# Patient Record
Sex: MALE | Race: BLACK OR AFRICAN AMERICAN | NOT HISPANIC OR LATINO | URBAN - METROPOLITAN AREA
[De-identification: names, ages, dates, MRNs, and addresses within clinical notes are randomized per-mention and may not be internally consistent; named-entity substitution may affect disease eponyms.]

---

## 2020-10-30 ENCOUNTER — EMERGENCY (EMERGENCY)
Facility: HOSPITAL | Age: 65
LOS: 1 days | Discharge: HOME | End: 2020-10-30
Attending: EMERGENCY MEDICINE
Payer: MEDICAID

## 2020-10-30 VITALS
WEIGHT: 248.02 LBS | OXYGEN SATURATION: 98 % | RESPIRATION RATE: 24 BRPM | HEIGHT: 70 IN | HEART RATE: 99 BPM | SYSTOLIC BLOOD PRESSURE: 207 MMHG | TEMPERATURE: 99 F | DIASTOLIC BLOOD PRESSURE: 104 MMHG

## 2020-10-30 VITALS — DIASTOLIC BLOOD PRESSURE: 114 MMHG | SYSTOLIC BLOOD PRESSURE: 216 MMHG

## 2020-10-30 LAB
ALBUMIN SERPL ELPH-MCNC: 3.8 G/DL — SIGNIFICANT CHANGE UP (ref 3.5–5)
ALP SERPL-CCNC: 90 U/L — SIGNIFICANT CHANGE UP (ref 40–120)
ALT FLD-CCNC: 40 U/L DA — SIGNIFICANT CHANGE UP (ref 10–60)
ANION GAP SERPL CALC-SCNC: 7 MMOL/L — SIGNIFICANT CHANGE UP (ref 5–17)
AST SERPL-CCNC: 27 U/L — SIGNIFICANT CHANGE UP (ref 10–40)
BASOPHILS # BLD AUTO: 0.03 K/UL — SIGNIFICANT CHANGE UP (ref 0–0.2)
BASOPHILS NFR BLD AUTO: 0.4 % — SIGNIFICANT CHANGE UP (ref 0–2)
BILIRUB SERPL-MCNC: 0.3 MG/DL — SIGNIFICANT CHANGE UP (ref 0.2–1.2)
BUN SERPL-MCNC: 16 MG/DL — SIGNIFICANT CHANGE UP (ref 7–18)
CALCIUM SERPL-MCNC: 8.7 MG/DL — SIGNIFICANT CHANGE UP (ref 8.4–10.5)
CHLORIDE SERPL-SCNC: 110 MMOL/L — HIGH (ref 96–108)
CO2 SERPL-SCNC: 26 MMOL/L — SIGNIFICANT CHANGE UP (ref 22–31)
CREAT SERPL-MCNC: 1.42 MG/DL — HIGH (ref 0.5–1.3)
D DIMER BLD IA.RAPID-MCNC: 167 NG/ML DDU — SIGNIFICANT CHANGE UP
EOSINOPHIL # BLD AUTO: 0.17 K/UL — SIGNIFICANT CHANGE UP (ref 0–0.5)
EOSINOPHIL NFR BLD AUTO: 2.3 % — SIGNIFICANT CHANGE UP (ref 0–6)
GLUCOSE SERPL-MCNC: 112 MG/DL — HIGH (ref 70–99)
HCT VFR BLD CALC: 44.2 % — SIGNIFICANT CHANGE UP (ref 39–50)
HGB BLD-MCNC: 14.8 G/DL — SIGNIFICANT CHANGE UP (ref 13–17)
IMM GRANULOCYTES NFR BLD AUTO: 0.4 % — SIGNIFICANT CHANGE UP (ref 0–1.5)
LYMPHOCYTES # BLD AUTO: 1.91 K/UL — SIGNIFICANT CHANGE UP (ref 1–3.3)
LYMPHOCYTES # BLD AUTO: 25.8 % — SIGNIFICANT CHANGE UP (ref 13–44)
MAGNESIUM SERPL-MCNC: 2.3 MG/DL — SIGNIFICANT CHANGE UP (ref 1.6–2.6)
MCHC RBC-ENTMCNC: 30.5 PG — SIGNIFICANT CHANGE UP (ref 27–34)
MCHC RBC-ENTMCNC: 33.5 GM/DL — SIGNIFICANT CHANGE UP (ref 32–36)
MCV RBC AUTO: 90.9 FL — SIGNIFICANT CHANGE UP (ref 80–100)
MONOCYTES # BLD AUTO: 0.38 K/UL — SIGNIFICANT CHANGE UP (ref 0–0.9)
MONOCYTES NFR BLD AUTO: 5.1 % — SIGNIFICANT CHANGE UP (ref 2–14)
NEUTROPHILS # BLD AUTO: 4.89 K/UL — SIGNIFICANT CHANGE UP (ref 1.8–7.4)
NEUTROPHILS NFR BLD AUTO: 66 % — SIGNIFICANT CHANGE UP (ref 43–77)
NRBC # BLD: 0 /100 WBCS — SIGNIFICANT CHANGE UP (ref 0–0)
NT-PROBNP SERPL-SCNC: 352 PG/ML — HIGH (ref 0–125)
NT-PROBNP SERPL-SCNC: 401 PG/ML — HIGH (ref 0–125)
PLATELET # BLD AUTO: 343 K/UL — SIGNIFICANT CHANGE UP (ref 150–400)
POTASSIUM SERPL-MCNC: 4.1 MMOL/L — SIGNIFICANT CHANGE UP (ref 3.5–5.3)
POTASSIUM SERPL-SCNC: 4.1 MMOL/L — SIGNIFICANT CHANGE UP (ref 3.5–5.3)
PROT SERPL-MCNC: 8.5 G/DL — HIGH (ref 6–8.3)
RBC # BLD: 4.86 M/UL — SIGNIFICANT CHANGE UP (ref 4.2–5.8)
RBC # FLD: 13.2 % — SIGNIFICANT CHANGE UP (ref 10.3–14.5)
SARS-COV-2 RNA SPEC QL NAA+PROBE: SIGNIFICANT CHANGE UP
SODIUM SERPL-SCNC: 143 MMOL/L — SIGNIFICANT CHANGE UP (ref 135–145)
TROPONIN I SERPL-MCNC: 0.07 NG/ML — HIGH (ref 0–0.04)
TROPONIN I SERPL-MCNC: 0.08 NG/ML — HIGH (ref 0–0.04)
WBC # BLD: 7.41 K/UL — SIGNIFICANT CHANGE UP (ref 3.8–10.5)
WBC # FLD AUTO: 7.41 K/UL — SIGNIFICANT CHANGE UP (ref 3.8–10.5)

## 2020-10-30 PROCEDURE — 71046 X-RAY EXAM CHEST 2 VIEWS: CPT

## 2020-10-30 PROCEDURE — 85379 FIBRIN DEGRADATION QUANT: CPT

## 2020-10-30 PROCEDURE — 87635 SARS-COV-2 COVID-19 AMP PRB: CPT

## 2020-10-30 PROCEDURE — 84484 ASSAY OF TROPONIN QUANT: CPT

## 2020-10-30 PROCEDURE — 83735 ASSAY OF MAGNESIUM: CPT

## 2020-10-30 PROCEDURE — 99292 CRITICAL CARE ADDL 30 MIN: CPT

## 2020-10-30 PROCEDURE — 85025 COMPLETE CBC W/AUTO DIFF WBC: CPT

## 2020-10-30 PROCEDURE — 80053 COMPREHEN METABOLIC PANEL: CPT

## 2020-10-30 PROCEDURE — 96375 TX/PRO/DX INJ NEW DRUG ADDON: CPT

## 2020-10-30 PROCEDURE — 36415 COLL VENOUS BLD VENIPUNCTURE: CPT

## 2020-10-30 PROCEDURE — 71046 X-RAY EXAM CHEST 2 VIEWS: CPT | Mod: 26

## 2020-10-30 PROCEDURE — 99291 CRITICAL CARE FIRST HOUR: CPT

## 2020-10-30 PROCEDURE — 93005 ELECTROCARDIOGRAM TRACING: CPT

## 2020-10-30 PROCEDURE — 99291 CRITICAL CARE FIRST HOUR: CPT | Mod: 25

## 2020-10-30 PROCEDURE — 96374 THER/PROPH/DIAG INJ IV PUSH: CPT

## 2020-10-30 PROCEDURE — 99292 CRITICAL CARE ADDL 30 MIN: CPT | Mod: 25

## 2020-10-30 PROCEDURE — 83880 ASSAY OF NATRIURETIC PEPTIDE: CPT

## 2020-10-30 RX ORDER — METOPROLOL TARTRATE 50 MG
1 TABLET ORAL
Qty: 30 | Refills: 0
Start: 2020-10-30 | End: 2020-11-28

## 2020-10-30 RX ORDER — ASPIRIN/CALCIUM CARB/MAGNESIUM 324 MG
162 TABLET ORAL ONCE
Refills: 0 | Status: COMPLETED | OUTPATIENT
Start: 2020-10-30 | End: 2020-10-30

## 2020-10-30 RX ORDER — LABETALOL HCL 100 MG
20 TABLET ORAL ONCE
Refills: 0 | Status: COMPLETED | OUTPATIENT
Start: 2020-10-30 | End: 2020-10-30

## 2020-10-30 RX ORDER — AMLODIPINE BESYLATE 2.5 MG/1
10 TABLET ORAL ONCE
Refills: 0 | Status: COMPLETED | OUTPATIENT
Start: 2020-10-30 | End: 2020-10-30

## 2020-10-30 RX ORDER — METOPROLOL TARTRATE 50 MG
100 TABLET ORAL ONCE
Refills: 0 | Status: COMPLETED | OUTPATIENT
Start: 2020-10-30 | End: 2020-10-30

## 2020-10-30 RX ORDER — AMLODIPINE BESYLATE 2.5 MG/1
1 TABLET ORAL
Qty: 30 | Refills: 0
Start: 2020-10-30 | End: 2020-11-28

## 2020-10-30 RX ORDER — HYDRALAZINE HCL 50 MG
10 TABLET ORAL ONCE
Refills: 0 | Status: COMPLETED | OUTPATIENT
Start: 2020-10-30 | End: 2020-10-30

## 2020-10-30 RX ADMIN — Medication 20 MILLIGRAM(S): at 11:28

## 2020-10-30 RX ADMIN — Medication 10 MILLIGRAM(S): at 13:52

## 2020-10-30 RX ADMIN — Medication 162 MILLIGRAM(S): at 16:19

## 2020-10-30 RX ADMIN — Medication 100 MILLIGRAM(S): at 16:19

## 2020-10-30 RX ADMIN — AMLODIPINE BESYLATE 10 MILLIGRAM(S): 2.5 TABLET ORAL at 16:19

## 2020-10-30 NOTE — ED PROVIDER NOTE - CARDIAC, MLM
Normal rate, regular rhythm.  Heart sounds S1, S2.  No murmurs, rubs or gallops. no jvd, no leg swelling

## 2020-10-30 NOTE — ED PROVIDER NOTE - OBJECTIVE STATEMENT
65 yr old male with hx of HTn only on metoprolol 100mg daily but ran out of it 2 days ago presents to ed c/o VÁZQUEZ while at wor at 730a.  pt states been tired since being home during pandemic.  today doing usual walking and c/o sob and weak. no fever, no chills, no visual changes, no headache, no numbness or tingling, no cough, no cp, no palpitations, no leg swelling, no syncope, no abd pain, no n/v/d, no dysuria, no rashes, no drug use.  increase weigh since pandemic, using normally 1-2 pillow

## 2020-10-30 NOTE — ED PROVIDER NOTE - CARE PLAN
Principal Discharge DX:	NSTEMI (non-ST elevated myocardial infarction)  Secondary Diagnosis:	Hypertensive emergency

## 2020-10-30 NOTE — ED PROVIDER NOTE - PROGRESS NOTE DETAILS
yan: work up mild georgie with trop 0.06 cxr official read cardiomegaly otherwise no acute findings. pt states he feels well no sx. no cp. rpt trop/ekg/bnp at 210p. yan: received 20 mg labetalol and hydralzine 10mg. still HTN but better. pt rpt trop .08. rpt ekg unchanged no dynamic changes or pseudonormalization.  pt BP remains elevated. refusing admission due to needing to care for mom at home.  understand all the risk, benefit and alternatives.  pt knowsn that he has an NSTEMI and Bp is out of control and can be deathly including, organ failure, permanent dysfunction, heart failure or stroke/renal failure.  ama- NSTEMi and HTN emergency

## 2020-10-30 NOTE — ED PROVIDER NOTE - PATIENT PORTAL LINK FT
You can access the FollowMyHealth Patient Portal offered by [facility] by registering at the following website: [email or web address]. By joining [facility] [employer]’s FollowMyHealth portal, you will also be able to view your health information using other applications (apps) compatible with our system.

## 2020-10-30 NOTE — ED PROVIDER NOTE - REFUSAL OF SERVICE, MDM
I had a detailed discussion with the patient and/or guardian regarding the historical points, exam findings, and any diagnostic results supporting the need to be admitted. Pt refuses admission and is aware of risks  such as worsening condition and possible death. Patient is fully alert and oriented and has self determination and mental capacity to make decisions.

## 2020-11-02 ENCOUNTER — APPOINTMENT (OUTPATIENT)
Dept: CARDIOLOGY | Facility: CLINIC | Age: 65
End: 2020-11-02
Payer: MEDICAID

## 2020-11-02 VITALS — SYSTOLIC BLOOD PRESSURE: 195 MMHG | DIASTOLIC BLOOD PRESSURE: 118 MMHG

## 2020-11-02 VITALS
BODY MASS INDEX: 38.94 KG/M2 | TEMPERATURE: 97.3 F | HEART RATE: 83 BPM | DIASTOLIC BLOOD PRESSURE: 142 MMHG | OXYGEN SATURATION: 98 % | HEIGHT: 70 IN | RESPIRATION RATE: 16 BRPM | SYSTOLIC BLOOD PRESSURE: 233 MMHG | WEIGHT: 272 LBS

## 2020-11-02 DIAGNOSIS — Z82.49 FAMILY HISTORY OF ISCHEMIC HEART DISEASE AND OTHER DISEASES OF THE CIRCULATORY SYSTEM: ICD-10-CM

## 2020-11-02 DIAGNOSIS — R77.8 OTHER SPECIFIED ABNORMALITIES OF PLASMA PROTEINS: ICD-10-CM

## 2020-11-02 DIAGNOSIS — Z78.9 OTHER SPECIFIED HEALTH STATUS: ICD-10-CM

## 2020-11-02 PROBLEM — Z00.00 ENCOUNTER FOR PREVENTIVE HEALTH EXAMINATION: Status: ACTIVE | Noted: 2020-11-02

## 2020-11-02 PROCEDURE — 99072 ADDL SUPL MATRL&STAF TM PHE: CPT

## 2020-11-02 PROCEDURE — 99204 OFFICE O/P NEW MOD 45 MIN: CPT

## 2020-11-02 RX ORDER — METOPROLOL SUCCINATE 100 MG/1
100 TABLET, EXTENDED RELEASE ORAL
Refills: 0 | Status: ACTIVE | COMMUNITY

## 2020-11-02 NOTE — HISTORY OF PRESENT ILLNESS
[FreeTextEntry1] : 65-year-old male with hypertension on metoprolol since 2018 who presents for initial evaluation. Patient was seen in the emergency room at [facility] on October 30. 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 He states that the symptoms lasted for about 10 minutes, and when EMS arrived and took him outside, he felt the "cold air" and immediately felt back to normal. In the emergency room, patient was noted to be hypertensive, with mild SALBADOR as well as elevated troponin levels. However, patient left AMA before further workup or treatment could be performed.\par \par Patient reports that he has been feeling completely normal since his hospital visit, with no further recurrence of dyspnea. Patient also denies any chest pain, palpitations, headaches, vision changes, neurologic deficits, LE edema, orthopnea, PND, or any other complaints at all.

## 2020-11-02 NOTE — ASSESSMENT
[FreeTextEntry1] : 65-year-old male with hypertension who presents for initial evaluation; recently to the ER with ? hypertensive urgency. \par \par 1. HTN: Since patient has no symptoms at all in the setting of such a high blood pressure, it suggests that BP has been chronically elevated in his case. I discussed with patient that he will likely need to be on multiple medications to get it under control. Will start with addition of amlodipine today and keep adding medications over the next few weeks until the blood pressure is at target.\par -Will also send patient for echocardiogram to assess EF as well as LVH\par -Will repeat all blood work including renal function. If his creatinine is stable, will plan on adding ACE inhibitor as the next antihypertensive agent\par -I discussed with patient that normally I would recommend him going to the emergency room with such a high blood pressure, however since he left AMA for the same reason, I agreed to treat him on an outpatient basis as long as he monitors himself for any symptoms, and immediately contacts EMS should he develop any chest pain, dyspnea, palpitations, headaches, vision changes, or any other concerning findings.\par -He will buy sphygmomanometer to track BP at home\par -Patient was instructed to followup with his PMD in 2 weeks to further titrate the medications, I will also have his blood pressure checked at the time of his echocardiogram in the hospital \par -Patient will see me in one month, he will call me if there are any issues\par \par 2.HLD: Will send patient for full set of routine blood work including lipid panel, LFTs, TFTs, A1c, et cetera.\par \par 3. Elevated troponin level: This likely represents type II MI (demand ischemia) in setting of hypertension as well as SALBADOR/CKD.\par -Will send patient for nuclear stress test once his blood pressure is better controlled. Ideally, would like him to run on the treadmill, however this can only be done when he is close to normotensive.\par \par FUV 1 month

## 2020-11-13 ENCOUNTER — OUTPATIENT (OUTPATIENT)
Dept: OUTPATIENT SERVICES | Facility: HOSPITAL | Age: 65
LOS: 1 days | End: 2020-11-13
Payer: MEDICAID

## 2020-11-13 DIAGNOSIS — I10 ESSENTIAL (PRIMARY) HYPERTENSION: ICD-10-CM

## 2020-11-13 PROCEDURE — 93306 TTE W/DOPPLER COMPLETE: CPT | Mod: 26

## 2020-11-13 PROCEDURE — 93306 TTE W/DOPPLER COMPLETE: CPT

## 2020-11-19 ENCOUNTER — NON-APPOINTMENT (OUTPATIENT)
Age: 65
End: 2020-11-19

## 2020-12-07 ENCOUNTER — APPOINTMENT (OUTPATIENT)
Dept: CARDIOLOGY | Facility: CLINIC | Age: 65
End: 2020-12-07
Payer: MEDICAID

## 2020-12-07 VITALS
DIASTOLIC BLOOD PRESSURE: 102 MMHG | WEIGHT: 272 LBS | SYSTOLIC BLOOD PRESSURE: 162 MMHG | HEIGHT: 70 IN | TEMPERATURE: 98 F | BODY MASS INDEX: 38.94 KG/M2 | OXYGEN SATURATION: 98 % | RESPIRATION RATE: 17 BRPM | HEART RATE: 95 BPM

## 2020-12-07 PROCEDURE — 99072 ADDL SUPL MATRL&STAF TM PHE: CPT

## 2020-12-07 PROCEDURE — 99214 OFFICE O/P EST MOD 30 MIN: CPT

## 2020-12-08 LAB
ALBUMIN SERPL ELPH-MCNC: 4.7 G/DL
ALP BLD-CCNC: 83 U/L
ALT SERPL-CCNC: 18 U/L
AST SERPL-CCNC: 17 U/L
BILIRUB DIRECT SERPL-MCNC: 0.1 MG/DL
BILIRUB INDIRECT SERPL-MCNC: 0.4 MG/DL
BILIRUB SERPL-MCNC: 0.5 MG/DL
CHOLEST SERPL-MCNC: 226 MG/DL
ESTIMATED AVERAGE GLUCOSE: 137 MG/DL
HBA1C MFR BLD HPLC: 6.4 %
HDLC SERPL-MCNC: 45 MG/DL
LDLC SERPL CALC-MCNC: 149 MG/DL
NONHDLC SERPL-MCNC: 181 MG/DL
PROT SERPL-MCNC: 8.3 G/DL
TRIGL SERPL-MCNC: 162 MG/DL
TSH SERPL-ACNC: 2.03 UIU/ML

## 2020-12-08 RX ORDER — ATORVASTATIN CALCIUM 20 MG/1
20 TABLET, FILM COATED ORAL
Qty: 30 | Refills: 5 | Status: ACTIVE | COMMUNITY
Start: 2020-12-08 | End: 1900-01-01

## 2020-12-08 RX ORDER — ENALAPRIL MALEATE 20 MG/1
20 TABLET ORAL
Qty: 30 | Refills: 0 | Status: ACTIVE | COMMUNITY
Start: 2020-11-20

## 2020-12-08 RX ORDER — FUROSEMIDE 40 MG/1
40 TABLET ORAL
Qty: 90 | Refills: 0 | Status: DISCONTINUED | COMMUNITY
Start: 2020-11-20 | End: 2020-12-08

## 2020-12-08 RX ORDER — METOPROLOL TARTRATE 100 MG/1
100 TABLET, FILM COATED ORAL
Qty: 30 | Refills: 0 | Status: DISCONTINUED | COMMUNITY
Start: 2020-10-30 | End: 2020-12-08

## 2020-12-08 NOTE — HISTORY OF PRESENT ILLNESS
[FreeTextEntry1] : 65-year-old male with hypertension, echocardiogram from 11/2020 showing preserved EF, LVH, and grade III diastolic dysfunction, who presents for follow-up visit. Patient reports that he was started on enalapril 20mg by his PMD several weeks ago. Overall he reports he feels better since last visit; reports he no longer has diaphoresis. Denies any chest pain, dyspnea, palpitations, lightheadedness, or syncope. Denies LE edema, orthopnea, or PND. Patient reports compliance with all medications, denies adverse effects.\par \par \par

## 2020-12-08 NOTE — ASSESSMENT
[FreeTextEntry1] : 65-year-old male with hypertension, echocardiogram from 11/2020 showing preserved EF, LVH, and grade III diastolic dysfunction, who presents for follow-up visit.  \par \par 1. HTN: Improved BP control on metoprolol, amlodipine, and enalapril, however still not at target. \par -Uncertain indication for furosemide, will switch to HCTZ instead for BP control\par -Chem 8 ordered. Creatinine at 1.35 may represent CKD in setting of long-standing HTN, will repeat chem 8 next month and if still elevated will send for renal US and nephrology eval. Uncertain why potassium is not included in the chem-8, perhaps it is pending. \par -Patient should also discontinue indomethacin, as it can contribute to HTN as well as to renal insufficiency. Tylenol may be an alternative.  \par \par 2.HLD: Patient's 10-year risk of atherosclerotic cardiovascular disease (ASCVD) as estimated by the pooled cohort equations is 23.9%.\par -Will start patient on atorvastatin 20mg PO QHS\par \par 3. Elevated troponin level: This likely represents type II MI (demand ischemia) in setting of hypertension as well as SALBADOR/CKD.\par -Will send patient for nuclear stress test once his blood pressure is better controlled. Ideally, would like him to run on the treadmill, however this can only be done when he is close to normotensive.\par \par FUV 1 month

## 2020-12-09 LAB
ANION GAP SERPL CALC-SCNC: 13 MMOL/L
BUN SERPL-MCNC: 14 MG/DL
CALCIUM SERPL-MCNC: 9.6 MG/DL
CHLORIDE SERPL-SCNC: 106 MMOL/L
CO2 SERPL-SCNC: 22 MMOL/L
CREAT SERPL-MCNC: 1.35 MG/DL
GLUCOSE SERPL-MCNC: 99 MG/DL
POTASSIUM SERPL-SCNC: 4.4 MMOL/L
SODIUM SERPL-SCNC: 141 MMOL/L

## 2021-01-11 ENCOUNTER — APPOINTMENT (OUTPATIENT)
Dept: CARDIOLOGY | Facility: CLINIC | Age: 66
End: 2021-01-11
Payer: MEDICAID

## 2021-01-11 VITALS
DIASTOLIC BLOOD PRESSURE: 87 MMHG | HEIGHT: 70 IN | OXYGEN SATURATION: 99 % | RESPIRATION RATE: 17 BRPM | TEMPERATURE: 98.4 F | BODY MASS INDEX: 38.94 KG/M2 | WEIGHT: 272 LBS | HEART RATE: 98 BPM | SYSTOLIC BLOOD PRESSURE: 149 MMHG

## 2021-01-11 DIAGNOSIS — I10 ESSENTIAL (PRIMARY) HYPERTENSION: ICD-10-CM

## 2021-01-11 DIAGNOSIS — N17.9 ACUTE KIDNEY FAILURE, UNSPECIFIED: ICD-10-CM

## 2021-01-11 PROCEDURE — 99214 OFFICE O/P EST MOD 30 MIN: CPT

## 2021-01-11 PROCEDURE — 99072 ADDL SUPL MATRL&STAF TM PHE: CPT

## 2021-01-11 RX ORDER — CLINDAMYCIN HYDROCHLORIDE 300 MG/1
300 CAPSULE ORAL
Qty: 24 | Refills: 0 | Status: DISCONTINUED | COMMUNITY
Start: 2020-11-28 | End: 2021-01-11

## 2021-01-11 RX ORDER — INDOMETHACIN 50 MG/1
50 CAPSULE ORAL
Qty: 15 | Refills: 0 | Status: DISCONTINUED | COMMUNITY
Start: 2020-09-20 | End: 2021-01-11

## 2021-01-11 RX ORDER — CHLORHEXIDINE GLUCONATE, 0.12% ORAL RINSE 1.2 MG/ML
0.12 SOLUTION DENTAL
Qty: 473 | Refills: 0 | Status: DISCONTINUED | COMMUNITY
Start: 2020-11-28 | End: 2021-01-11

## 2021-01-11 NOTE — ASSESSMENT
[FreeTextEntry1] : 65-year-old male with hypertension, echocardiogram from 11/2020 showing preserved EF, LVH, and grade III diastolic dysfunction, who presents for follow-up visit.  \par \par 1. HTN: Improved BP control on metoprolol, amlodipine, and enalapril. He has also implemented lifestyle modifications.\par -Will see if patient's BP continues to improve over next 2 months. If not at target by then, will increase enalapril to 40mg daily \par -Patient will go for repeat chem 8 prior to his next visit with me, Rx placed\par \par 2.HLD: Patient's 10-year risk of atherosclerotic cardiovascular disease (ASCVD) as estimated by the pooled cohort equations is 23.9%.\par -Continue atorvastatin 20mg PO QHS; repeat lipid panel in 4 months\par \par 3. Elevated troponin level: This likely represents type II MI (demand ischemia) in setting of hypertension as well as SALBADOR/CKD.\par -Patient with no anginal complaints whatsoever; he is already on beta blocker and statin. Will hold off further cardiac testing unless patient develops any symptoms. \par \par 4. Erectile dysfunction: May be in setting of HTN, as well as beta blocker effect (though cannot decrease dose of medication at this time)\par -Will send patient for evaluation by urology, consideration of PDE inhibitor (no contraindications from cardiac perspective)\par \par FUV 2 months

## 2021-01-11 NOTE — HISTORY OF PRESENT ILLNESS
[FreeTextEntry1] : 65-year-old male with hypertension, echocardiogram from 11/2020 showing preserved EF, LVH, and grade III diastolic dysfunction, who presents for follow-up visit. He works as a . Has no exertional symptoms. Denies any chest pain, dyspnea, palpitations, lightheadedness, or syncope. Denies LE edema, orthopnea, or PND. Patient reports compliance with all medications, denies adverse effects.\par \par He is asking regarding resuming sexual relations, and is concerned about some degree of ED. \par \par \par

## 2021-01-15 ENCOUNTER — APPOINTMENT (OUTPATIENT)
Dept: UROLOGY | Facility: CLINIC | Age: 66
End: 2021-01-15
Payer: MEDICAID

## 2021-01-15 DIAGNOSIS — Z12.5 ENCOUNTER FOR SCREENING FOR MALIGNANT NEOPLASM OF PROSTATE: ICD-10-CM

## 2021-01-15 DIAGNOSIS — I10 ESSENTIAL (PRIMARY) HYPERTENSION: ICD-10-CM

## 2021-01-15 DIAGNOSIS — E78.5 HYPERLIPIDEMIA, UNSPECIFIED: ICD-10-CM

## 2021-01-15 DIAGNOSIS — F52.21 MALE ERECTILE DISORDER: ICD-10-CM

## 2021-01-15 DIAGNOSIS — N52.9 MALE ERECTILE DYSFUNCTION, UNSPECIFIED: ICD-10-CM

## 2021-01-15 PROCEDURE — 99204 OFFICE O/P NEW MOD 45 MIN: CPT

## 2021-01-15 PROCEDURE — 99072 ADDL SUPL MATRL&STAF TM PHE: CPT

## 2021-01-15 RX ORDER — TADALAFIL 20 MG/1
20 TABLET ORAL
Qty: 30 | Refills: 1 | Status: ACTIVE | COMMUNITY
Start: 2021-01-15 | End: 1900-01-01

## 2021-01-15 NOTE — HISTORY OF PRESENT ILLNESS
[FreeTextEntry1] : This is a very pleasant 65 year old male referred by dr. jha for erectile dysfunction. Pt states for the past few years he has noticed increased impotence and for the past few months he has had no erectile function.  Patient has trialed cilais (unsure of dose) over a year ago with mild effect and eventually reports that it did not work. Pt states he had 6/10 hardness initially, now 1/10.  Patient unable to get erection with masturbation.  Patient has hypertension and began taking antihypertensives this past October.   No family history of  malignancy.

## 2021-01-15 NOTE — PHYSICAL EXAM
[General Appearance - Well Developed] : well developed [General Appearance - Well Nourished] : well nourished [Normal Appearance] : normal appearance [Well Groomed] : well groomed [General Appearance - In No Acute Distress] : no acute distress [Edema] : no peripheral edema [Respiration, Rhythm And Depth] : normal respiratory rhythm and effort [Exaggerated Use Of Accessory Muscles For Inspiration] : no accessory muscle use [Abdomen Soft] : soft [Abdomen Tenderness] : non-tender [Costovertebral Angle Tenderness] : no ~M costovertebral angle tenderness [Urethral Meatus] : meatus normal [Urinary Bladder Findings] : the bladder was normal on palpation [Scrotum] : the scrotum was normal [Testes Mass (___cm)] : there were no testicular masses [Normal Station and Gait] : the gait and station were normal for the patient's age [] : no rash [No Focal Deficits] : no focal deficits [Oriented To Time, Place, And Person] : oriented to person, place, and time [Affect] : the affect was normal [Mood] : the mood was normal [Not Anxious] : not anxious [No Palpable Adenopathy] : no palpable adenopathy [No Prostate Nodules] : no prostate nodules [FreeTextEntry1] : no prostate nodules, masses, nontender

## 2021-01-15 NOTE — ASSESSMENT
[FreeTextEntry1] : This is a very pleasant 65 year old male with erectile dysfunction. \par -We had an extensive discussion regarding possible management options for erectile dysfunction, including PDE 5 inhibitors, LUANA, ICI, intraurethral alprostadil, penile prosthesis. \par -Labs reviewed\par -Discussed the rationale for screening for prostate cancer with PSA and digital rectal exam. We discussed risk factors for the development of prostate cancer.  We also discussed the natural history of prostate cancer.\par -PSA\par -After a thorough discussion of the risks and benefits of the aforementioned options he would like to try a trial of a PDE 5 inhibitor, cialis 20mg\par -I discussed the risks, benefits, alternatives, and possible side effects of Cialis (tadalafil) therapy with the patient, including but not limited to headache, flushing, upset stomach, blurry vision, change in color vision, vision loss, and priapism with the patient.\par -Follow-up in 1 month\par

## 2021-02-19 ENCOUNTER — APPOINTMENT (OUTPATIENT)
Dept: UROLOGY | Facility: CLINIC | Age: 66
End: 2021-02-19

## 2021-03-19 ENCOUNTER — RX RENEWAL (OUTPATIENT)
Age: 66
End: 2021-03-19

## 2021-03-19 RX ORDER — AMLODIPINE BESYLATE 10 MG/1
10 TABLET ORAL DAILY
Qty: 30 | Refills: 5 | Status: ACTIVE | COMMUNITY
Start: 2020-11-02 | End: 1900-01-01

## 2021-03-30 ENCOUNTER — APPOINTMENT (OUTPATIENT)
Dept: CARDIOLOGY | Facility: CLINIC | Age: 66
End: 2021-03-30

## 2021-06-20 ENCOUNTER — RX RENEWAL (OUTPATIENT)
Age: 66
End: 2021-06-20

## 2021-08-18 ENCOUNTER — RX RENEWAL (OUTPATIENT)
Age: 66
End: 2021-08-18

## 2021-12-13 ENCOUNTER — RX RENEWAL (OUTPATIENT)
Age: 66
End: 2021-12-13

## 2021-12-13 RX ORDER — HYDROCHLOROTHIAZIDE 25 MG/1
25 TABLET ORAL DAILY
Qty: 30 | Refills: 1 | Status: ACTIVE | COMMUNITY
Start: 2020-12-08 | End: 1900-01-01

## 2022-10-23 ENCOUNTER — RX RENEWAL (OUTPATIENT)
Age: 67
End: 2022-10-23

## 2022-11-26 ENCOUNTER — RX RENEWAL (OUTPATIENT)
Age: 67
End: 2022-11-26

## 2023-01-03 ENCOUNTER — APPOINTMENT (OUTPATIENT)
Dept: CARDIOLOGY | Facility: CLINIC | Age: 68
End: 2023-01-03
